# Patient Record
Sex: MALE | Race: OTHER | NOT HISPANIC OR LATINO | Employment: UNEMPLOYED | URBAN - METROPOLITAN AREA
[De-identification: names, ages, dates, MRNs, and addresses within clinical notes are randomized per-mention and may not be internally consistent; named-entity substitution may affect disease eponyms.]

---

## 2024-07-10 ENCOUNTER — HOSPITAL ENCOUNTER (EMERGENCY)
Facility: HOSPITAL | Age: 24
Discharge: HOME | End: 2024-07-10
Attending: EMERGENCY MEDICINE | Admitting: EMERGENCY MEDICINE
Payer: COMMERCIAL

## 2024-07-10 VITALS
RESPIRATION RATE: 15 BRPM | HEIGHT: 71 IN | DIASTOLIC BLOOD PRESSURE: 74 MMHG | TEMPERATURE: 99 F | HEART RATE: 94 BPM | SYSTOLIC BLOOD PRESSURE: 125 MMHG | WEIGHT: 125 LBS | OXYGEN SATURATION: 99 % | BODY MASS INDEX: 17.5 KG/M2

## 2024-07-10 DIAGNOSIS — R45.851 SUICIDAL IDEATIONS: ICD-10-CM

## 2024-07-10 DIAGNOSIS — F32.A DEPRESSION: ICD-10-CM

## 2024-07-10 DIAGNOSIS — R45.4 ANGER: Primary | ICD-10-CM

## 2024-07-10 LAB
AMPHETAMINES SERPL QL SCN: NEGATIVE
BARBITURATES UR QL: NEGATIVE
BENZODIAZ UR QL: NEGATIVE
COCAINE UR QL: NEGATIVE
ETHANOL EXG-MCNC: 0 MG/DL
FENTANYL UR QL SCN: NEGATIVE
HYDROCODONE UR QL SCN: NEGATIVE
METHADONE UR QL: NEGATIVE
OPIATES UR QL SCN: NEGATIVE
OXYCODONE+OXYMORPHONE UR QL SCN: NEGATIVE
PCP UR QL: NEGATIVE
THC UR QL: POSITIVE

## 2024-07-10 PROCEDURE — 82075 ASSAY OF BREATH ETHANOL: CPT | Performed by: EMERGENCY MEDICINE

## 2024-07-10 PROCEDURE — 80307 DRUG TEST PRSMV CHEM ANLYZR: CPT | Performed by: EMERGENCY MEDICINE

## 2024-07-10 PROCEDURE — 99284 EMERGENCY DEPT VISIT MOD MDM: CPT | Performed by: EMERGENCY MEDICINE

## 2024-07-10 PROCEDURE — 99244 OFF/OP CNSLTJ NEW/EST MOD 40: CPT | Performed by: PSYCHIATRY & NEUROLOGY

## 2024-07-10 RX ORDER — OLANZAPINE 2.5 MG/1
5 TABLET, FILM COATED ORAL ONCE
Status: COMPLETED | OUTPATIENT
Start: 2024-07-10 | End: 2024-07-10

## 2024-07-10 RX ORDER — OLANZAPINE 5 MG/1
5 TABLET ORAL
Qty: 60 TABLET | Refills: 0 | Status: SHIPPED | OUTPATIENT
Start: 2024-07-10 | End: 2024-09-08

## 2024-07-10 RX ADMIN — OLANZAPINE 5 MG: 2.5 TABLET, FILM COATED ORAL at 14:47

## 2024-07-10 NOTE — DISCHARGE INSTRUCTIONS
Follow up with PSYCHIATRY as recommended    A  personal message from Dr. Bryan Muñoz,  Thank you so much for allowing me to care for you today.    I pride myself in the care and attention I give all my patients.  I hope you were a witness to this tonight.   If for any reason your condition does not improve or worsens, or you have a question that was not answered during your visit you can feel free to text me on my personal phone #  # [phone].   I will answer to your message and continue your care past your emergency room visit.     Please understand that although you are being discharged because your condition has been deemed stable and able to be managed on an outpatient setting. However your condition may worsen as part of the natural progression of the illness/condition, if this occurs please come back to the emergency department for a repeat evaluation.

## 2024-07-10 NOTE — ED NOTES
"Inquiring medication and therapy for anger issues at home. SI thoughts with a weapon or rope, states \"recently grabbed gun, but put it away\" Denies HI/AH/VH at this time. CW was able to speak with the pt and sister at bedside. Pt reports living with his father and sister. 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 PT stated that he has a Dx of asperger's which causes increased anger and agitation. PT reports smoking weed to help him with calming him down. PT stated that he has had SI thoughts. Pt self harmed when he was in HS. Pt stated that he has access to weapons but they are put away and locked. PT denied legal issues. PT reports having bad sleep described it being off and on. PT reports his appetite as okay. PT stated that he ahs never been IP in the past. PT has had OP services. PT reports that he was on abilify 5mg which helped but it made his stomach hurt. PT stated that he would like to be on that or something similar. PT willing for help. Updated  and psych harry put in for possible medications.   "

## 2024-07-10 NOTE — TELEMEDICINE
TeleConsultation - Behavioral Health   Bola Evans 24 y.o. male MRN: [id number]  Unit/Bed#: FT 03 Encounter: [id number]      VIRTUAL CARE DOCUMENTATION:     1. This service was provided via Telemedicine using Teams Virtual Rounding      2. Parties in the room with patient during teleconsult Family member: sister     3. Confidentiality My office door was closed     4. Participants No one else was in the room    5. Patient acknowledged consent and understanding of privacy and security of the  Telemedicine consult. I informed the patient that I have reviewed their record in Epic and presented the opportunity for them to ask any questions regarding the visit today.  The patient agreed to participate.    6. Time spent 30       Assessment & Plan     Present on Admission:  **None**    Assessment:    24-year-old male with history of autistic spectrum disorder presenting with complaint of anger issues.  Autistic spectrum disorder; unspecified mood disorder; rule out intermittent explosive disorder    Treatment Plan:    As the patient and his sister report he did very well on the past on Abilify 5 mg p.o. daily but discontinued it secondary to nausea, recommend instead Zyprexa 5 mg p.o. daily routine with additional 5 mg p.o. daily as needed agitation as mood stabilizer.  The patient is in agreement this plan and gives his informed consent for the medication.  Recommend Zyprexa 5 mg p.o. here in the emergency department before discharge.  Additionally recommend outpatient psychiatric follow-up for medication management with a psychotherapy/counseling component.  The most ideal would be something initially in the psychiatric partial hospitalization/intensive outpatient treatment spectrum if available for this patient.  Another alternative would be any specialized programs available for high functioning adults with autistic spectrum disorder.  No psychiatric precautions are indicated at this time.  The patient and his  sister are in agreement this plan.        Current Medications:         Risks / Benefits of Treatment:    Risks, benefits, and possible side effects of medications explained to patient and patient verbalizes understanding.      Other treatment modalities recommended as indicated:    psychotherapy  outpatient referral      Consult to Psychiatry  Consult performed by: Junior Saenz MD  Consult ordered by: Bryan Muñoz MD        Physician Requesting Consult: Bryan Muñoz MD  Principal Problem:<principal problem not specified>    Reason for Consult: Anger issues      History of Present Illness      Patient is a 24 y.o. male who has presented to the emergency department with complaint of anger management issues.  The patient explains that he frequently misinterprets the events or communications to indicate he is in someway inferior and at times reacts with anger regarding handling his personal effects or punching a wall.  He denies any self-harm ideation or any thoughts of harming others.    Past psychiatric history: The patient reports history of autistic spectrum disorder.  He states he was diagnosed in high school.  He reports that 11 or 12 years ago in the high school he had suicidal ideation but never attempted suicide.  He did self-harm in high school.  He denies any self-harm or death wishes or suicidal ideation since that time.  He in the past was started on Abilify 5 mg daily as mood stabilizer for his anger issues and found it very beneficial.  He states he stopped the medication secondary to nausea.  Sister reports the patient did very well on Abilify but tends to stop his medication and treatment when he is doing well or when he feels down on himself.    Social history: The patient is single.  He is currently unemployed.  He lives with family.    Family history: Unremarkable    Substance use history: The patient states he smokes marijuana to calm himself down.  He denies other substance use.    Mental  "status examination: The patient is alert and well oriented all spheres.  Affect is pleasant.  Made good eye contact.  He is cooperative and appeared to be forthcoming in response to questions.  Affect is currently euthymic congruent with current stated mood.  He does admit that he frequently becomes angry at himself.  Sensorium is clear.  Thought process is logical and linear.  Thought content is reality based.  Associations are tight.  Memory is grossly intact in all spheres.  Appears to be of average intelligence by his use of vocabulary, general fund of knowledge, semistructured syntax.  He denies suicidal homicidal ideation.  He denies hallucinations and other psychotic features.  Insight and judgment are intact at this time.    Summit suicide severity risk scale: The patient denies any death wishes or suicidal ideation over the past 11 to 12 years.  He scores no risk for suicide at this time.    Past Medical History:   Diagnosis Date    Anxiety     Depression        Medical Review Of Systems:    Review of Systems    Meds/Allergies     all current active meds have been reviewed  No Known Allergies    Objective     Vital signs in last 24 hours:  Temp:  [99 °F (37.2 °C)] 99 °F (37.2 °C)  HR:  [94] 94  Resp:  [15] 15  BP: (125)/(74) 125/74    No intake or output data in the 24 hours ending 07/10/24 1349        Lab Results: I have personally reviewed all pertinent laboratory/tests results.         Imaging Studies: No results found.  EKG/Pathology/Other Studies: No results found for: \"VENTRATE\", \"ATRIALRATE\", \"PRINT\", \"QRSDINT\", \"QTINT\", \"QTCINT\", \"PAXIS\", \"QRSAXIS\", \"TWAVEAXIS\"     Code Status: No Order  Advance Directive and Living Will:      Power of :    POLST:      Screenings:    1. Nutrition Screening  Not available on chart    2. Pain Screening  Not available on chart    3. Suicide Screening  Not available on chart    Counseling / Coordination of Care:    Total floor / unit time spent today 30 " minutes. Greater than 50% of total time was spent with the patient and / or family counseling and / or coordination of care. A description of the counseling / coordination of care: Chart review, patient evaluation, coordination communication with staff, nursing and provider.

## 2024-07-10 NOTE — ED NOTES
This writer discussed the patients current presentation and recommended discharge plan with Dr. Muñoz  They agree with the patient being discharged at this time with referrals and/or information about OP resources     The patient was Instructed to follow up with their PCP and potential OP providers   The patient was provided with referral information for:   OP services in his area.      This writer and the patient completed a safety plan.  The patient was provided with a copy of their safety plan with encouragement to utilize the plan following discharge.     In addition, the patient was instructed to call local [facility] crisis, other crisis services, 911 or to go to the nearest ER immediately if their situation changes at any time.       SAFETY PLAN  Warning Signs (thoughts, images, mood, behavior, situations) of a potential crisis: increased agitation, anger and irritability      Coping Skills (what can I do to take my mind off the problem, or to keep myself safe): listening to music, watching TV and talking a walk      Outside Support (who can I reach out to for support and help): Family and friends        National Suicide Prevention Hotline:  [address] [place] [address] [phone] - Crisis   [facility] [phone] - [facility] Crisis/Mobile Crisis   [phone] - [facility] Crisis   [facility]: [phone]  [facility]: [phone]   [facility] [phone] - Crisis   [facility] [phone] - Crisis     [facility] [phone] - Crisis   [facility] [phone] - Crisis   [phone] - Peer Support Talk Line (1-9pm daily)  [phone] - Teen Support Talk Line (1-9pm daily)  [phone] - [facility]   [facility] [phone]- Crisis    [facility] [phone] - Crisis   [facility] [phone] - Crisis    [facility]) [phone] - [facility] Crisis

## 2024-07-10 NOTE — ED PROVIDER NOTES
"History  Chief Complaint   Patient presents with    Psychiatric Evaluation     Inquiring medication and therapy for anger issues at home. SI thoughts with a weapon or rope, states \"recently grabbed gun, but put it away\" Denies HI/AH/VH at this time.      Bola Evans is a 24 y.o.  year old male  Past Medical History:  No date: Anxiety  No date: Depression  Social History    Tobacco Use      Smoking status: Every Day        Types: Cigarettes      Smokeless tobacco: Never    Alcohol use: Not Currently    Drug use: Not Currently      Types: Marijuana    Patient presents with:  Psychiatric Evaluation: Inquiring medication and therapy for anger issues at home. SI thoughts with a weapon or rope, states \"recently grabbed gun, but put it away\" Denies HI/AH/VH at this time.   Recent ideations of SI and grabbed his gun, but no access to gun at this time.  Patient not actively SI    History obtained directly from the PATIENT              History provided by:  Patient and relative   used: No    Psychiatric Evaluation  Presenting symptoms: agitation, depression and suicidal thoughts    Presenting symptoms: no hallucinations, no homicidal ideas, no paranoid behavior and no self-mutilation    Associated symptoms: no abdominal pain and no chest pain        None       Past Medical History:   Diagnosis Date    Anxiety     Depression        History reviewed. No pertinent surgical history.    History reviewed. No pertinent family history.  I have reviewed and agree with the history as documented.    E-Cigarette/Vaping     E-Cigarette/Vaping Substances     Social History     Tobacco Use    Smoking status: Every Day     Types: Cigarettes    Smokeless tobacco: Never   Substance Use Topics    Alcohol use: Not Currently    Drug use: Not Currently     Types: Marijuana       Review of Systems   Constitutional:  Negative for chills and fever.   HENT:  Negative for ear pain and sore throat.    Eyes:  Negative for pain " and visual disturbance.   Respiratory:  Negative for cough and shortness of breath.    Cardiovascular:  Negative for chest pain and palpitations.   Gastrointestinal:  Negative for abdominal pain and vomiting.   Genitourinary:  Negative for dysuria and hematuria.   Musculoskeletal:  Negative for arthralgias and back pain.   Skin:  Negative for color change and rash.   Neurological:  Negative for seizures and syncope.   Psychiatric/Behavioral:  Positive for agitation and suicidal ideas. Negative for hallucinations, homicidal ideas, paranoia and self-injury.    All other systems reviewed and are negative.      Physical Exam  Physical Exam  Vitals and nursing note reviewed.   Constitutional:       General: He is not in acute distress.     Appearance: He is well-developed.   HENT:      Head: Normocephalic and atraumatic.   Eyes:      Conjunctiva/sclera: Conjunctivae normal.   Cardiovascular:      Rate and Rhythm: Normal rate and regular rhythm.      Heart sounds: No murmur heard.  Pulmonary:      Effort: Pulmonary effort is normal. No respiratory distress.      Breath sounds: Normal breath sounds.   Abdominal:      Palpations: Abdomen is soft.      Tenderness: There is no abdominal tenderness.   Musculoskeletal:         General: No swelling.      Cervical back: Neck supple.   Skin:     General: Skin is warm and dry.      Capillary Refill: Capillary refill takes less than 2 seconds.   Neurological:      General: No focal deficit present.      Mental Status: He is alert and oriented to person, place, and time.   Psychiatric:         Thought Content: Thought content normal.      Comments: Depression symptoms         Vital Signs  ED Triage Vitals [07/10/24 1110]   Temperature Pulse Respirations Blood Pressure SpO2   99 °F (37.2 °C) 94 15 125/74 99 %      Temp Source Heart Rate Source Patient Position - Orthostatic VS BP Location FiO2 (%)   Oral Monitor Sitting Right arm --      Pain Score       --           Vitals:     07/10/24 1110   BP: 125/74   Pulse: 94   Patient Position - Orthostatic VS: Sitting         Visual Acuity      ED Medications  Medications   OLANZapine (ZyPREXA) tablet 5 mg (5 mg Oral Given 7/10/24 1447)       Diagnostic Studies  Results Reviewed       Procedure Component Value Units Date/Time    Rapid drug screen, urine [[id number]]  (Abnormal) Collected: 07/10/24 1248    Lab Status: Final result Specimen: Urine, Clean Catch Updated: 07/10/24 1326     Amph/Meth UR Negative     Barbiturate Ur Negative     Benzodiazepine Urine Negative     Cocaine Urine Negative     Methadone Urine Negative     Opiate Urine Negative     PCP Ur Negative     THC Urine Positive     Oxycodone Urine Negative     Fentanyl Urine Negative     HYDROCODONE URINE Negative    Narrative:      Presumptive report. If requested, specimen will be sent to reference lab for confirmation.  FOR MEDICAL PURPOSES ONLY.   IF CONFIRMATION NEEDED PLEASE CONTACT THE LAB WITHIN 5 DAYS.    Drug Screen Cutoff Levels:  AMPHETAMINE/METHAMPHETAMINES  1000 ng/mL  BARBITURATES     200 ng/mL  BENZODIAZEPINES     200 ng/mL  COCAINE      300 ng/mL  METHADONE      300 ng/mL  OPIATES      300 ng/mL  PHENCYCLIDINE     25 ng/mL  THC       50 ng/mL  OXYCODONE      100 ng/mL  FENTANYL      5 ng/mL  HYDROCODONE     300 ng/mL    POCT alcohol breath test [[id number]]  (Normal) Resulted: 07/10/24 1243    Lab Status: Final result Updated: 07/10/24 1243     EXTBreath Alcohol 0.000                   No orders to display              Procedures  Procedures         ED Course  ED Course as of 07/11/24 1420   Wed Jul 10, 2024   1344 This patient presents with symptoms consistent with an underlying psychiatric disorder, most likely depression with anger bursts.   Presentation not consistent with acute organic causes to include delirium, dementia or drug induced disorders (acute ingestions or withdrawal; no evidence of toxidrome). Given the H&P, I suspect this patient is depressed with SI  (no active plan)  and will require psychiatric care.   Will consult crisis worker to evaluate the patient for potential hold.   Will also obtain labs for medical clearance.    Patient has been medically evaluated by myself and is determined to be stable and cleared for further mental health evaluation and treatment.                                   SBIRT 22yo+      Flowsheet Row Most Recent Value   Initial Alcohol Screen: US AUDIT-C     1. How often do you have a drink containing alcohol? 0 Filed at: 07/10/2024 1326   2. How many drinks containing alcohol do you have on a typical day you are drinking?  0 Filed at: 07/10/2024 1251   3a. Male UNDER 65: How often do you have five or more drinks on one occasion? 0 Filed at: 07/10/2024 1251   3b. FEMALE Any Age, or MALE 65+: How often do you have 4 or more drinks on one occassion? 0 Filed at: 07/10/2024 1251   Audit-C Score 0 Filed at: 07/10/2024 1326   TRUDI: How many times in the past year have you...    Used an illegal drug or used a prescription medication for non-medical reasons? Never Filed at: 07/10/2024 1251                      Medical Decision Making  Problems Addressed:  Anger: chronic illness or injury with exacerbation, progression, or side effects of treatment  Depression: chronic illness or injury    Amount and/or Complexity of Data Reviewed  Labs: ordered.  Discussion of management or test interpretation with external provider(s): Seen by Tele-Psych    Risk  Prescription drug management.                 Disposition  Final diagnoses:   Anger   Depression   Suicidal ideations     Time reflects when diagnosis was documented in both MDM as applicable and the Disposition within this note       Time User Action Codes Description Comment    7/10/2024  1:16 PM Bryan Muñoz [R45.4] Anger     7/10/2024  2:36 PM Bryan Muñoz [F32.A] Depression     7/10/2024  2:36 PM Bryan Muñoz [R45.851] Suicidal ideations           ED Disposition       ED Disposition    Discharge    Condition   Stable    Date/Time   Wed Jul 10, 2024 1436    Comment   Bola Nathan discharge to home/self care.                   Follow-up Information    None         Discharge Medication List as of 7/10/2024  2:38 PM        START taking these medications    Details   OLANZapine (ZyPREXA) 5 mg tablet Take 1 tablet (5 mg total) by mouth daily at bedtime Take additional pill as needed to control agitation, Starting Wed 7/10/2024, Until Sun 9/8/2024, Normal             No discharge procedures on file.    PDMP Review       None            ED Provider  Electronically Signed by             Bryan Muñoz MD  07/11/24 5206